# Patient Record
Sex: MALE | Race: WHITE | Employment: STUDENT | URBAN - METROPOLITAN AREA
[De-identification: names, ages, dates, MRNs, and addresses within clinical notes are randomized per-mention and may not be internally consistent; named-entity substitution may affect disease eponyms.]

---
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
## 2020-01-01 ENCOUNTER — HOSPITAL ENCOUNTER (OUTPATIENT)
Age: 20
Discharge: HOME | End: 2020-01-01
Attending: FAMILY MEDICINE
Payer: COMMERCIAL

## 2020-01-01 VITALS
TEMPERATURE: 100 F | SYSTOLIC BLOOD PRESSURE: 157 MMHG | DIASTOLIC BLOOD PRESSURE: 95 MMHG | RESPIRATION RATE: 20 BRPM | HEART RATE: 102 BPM | OXYGEN SATURATION: 96 %

## 2020-01-01 DIAGNOSIS — J11.1 FLU SYNDROME: Primary | ICD-10-CM

## 2020-01-01 DIAGNOSIS — J02.9 ACUTE PHARYNGITIS, UNSPECIFIED ETIOLOGY: ICD-10-CM

## 2020-01-01 DIAGNOSIS — B35.6 JOCK ITCH: ICD-10-CM

## 2020-01-01 LAB
POCT INFLUENZA A: NEGATIVE
POCT INFLUENZA B: NEGATIVE
POCT RAPID STREP: NEGATIVE

## 2020-01-01 PROCEDURE — 99204 OFFICE O/P NEW MOD 45 MIN: CPT

## 2020-01-01 PROCEDURE — 87502 INFLUENZA DNA AMP PROBE: CPT | Performed by: FAMILY MEDICINE

## 2020-01-01 PROCEDURE — 87081 CULTURE SCREEN ONLY: CPT | Performed by: FAMILY MEDICINE

## 2020-01-01 PROCEDURE — 87430 STREP A AG IA: CPT | Performed by: FAMILY MEDICINE

## 2020-01-01 RX ORDER — OSELTAMIVIR PHOSPHATE 75 MG/1
75 CAPSULE ORAL 2 TIMES DAILY
Qty: 10 CAPSULE | Refills: 0 | Status: SHIPPED | OUTPATIENT
Start: 2020-01-01 | End: 2020-01-06

## 2020-01-01 RX ORDER — ONDANSETRON 8 MG/1
8 TABLET, ORALLY DISINTEGRATING ORAL EVERY 12 HOURS PRN
Qty: 10 TABLET | Refills: 0 | Status: SHIPPED | OUTPATIENT
Start: 2020-01-01 | End: 2020-01-09 | Stop reason: ALTCHOICE

## 2020-01-01 RX ORDER — IBUPROFEN 600 MG/1
600 TABLET ORAL ONCE
Status: COMPLETED | OUTPATIENT
Start: 2020-01-01 | End: 2020-01-01

## 2020-01-01 RX ORDER — CLOTRIMAZOLE AND BETAMETHASONE DIPROPIONATE 10; .64 MG/G; MG/G
CREAM TOPICAL
Qty: 45 G | Refills: 0 | Status: SHIPPED | OUTPATIENT
Start: 2020-01-01 | End: 2021-01-15

## 2020-01-01 RX ORDER — BENZONATATE 200 MG/1
200 CAPSULE ORAL 3 TIMES DAILY PRN
Qty: 15 CAPSULE | Refills: 0 | Status: SHIPPED | OUTPATIENT
Start: 2020-01-01 | End: 2020-01-09 | Stop reason: ALTCHOICE

## 2020-01-01 NOTE — ED PROVIDER NOTES
Patient Seen in: [facility]      History   Patient presents with:  Cough/URI    Stated Complaint: cough, mucus, no energy, chills/fever     HPI    26-year-old male  presents to the immediate care today with chief complaints of f discharge, mild congestion, no sinus tenderness. No nasal flaring  Throat: abnormal findings: mild oropharyngeal erythema. Buccal mucosa moist.   Neck: no adenopathy  Lungs: clear to auscultation bilaterally. No chest wall retractions.  No respiratory distr for cough. Qty: 15 capsule Refills: 0    clotrimazole-betamethasone 1-0.05 % External Cream  Apply to affected area twice a day until resolution of the rash.   Avoid contact with normal skin  Qty: 45 g Refills: 0

## 2020-01-06 ENCOUNTER — HOSPITAL ENCOUNTER (OUTPATIENT)
Age: 20
Discharge: ANOTHER HOSPITAL | End: 2020-01-06
Attending: FAMILY MEDICINE
Payer: COMMERCIAL

## 2020-01-06 ENCOUNTER — HOSPITAL ENCOUNTER (EMERGENCY)
Facility: HOSPITAL | Age: 20
Discharge: HOME | End: 2020-01-06
Attending: EMERGENCY MEDICINE
Payer: COMMERCIAL

## 2020-01-06 ENCOUNTER — APPOINTMENT (OUTPATIENT)
Dept: GENERAL RADIOLOGY | Age: 20
End: 2020-01-06
Attending: PHYSICIAN ASSISTANT
Payer: COMMERCIAL

## 2020-01-06 VITALS
DIASTOLIC BLOOD PRESSURE: 83 MMHG | BODY MASS INDEX: 28 KG/M2 | RESPIRATION RATE: 18 BRPM | OXYGEN SATURATION: 100 % | TEMPERATURE: 98 F | WEIGHT: 231.25 LBS | HEART RATE: 88 BPM | SYSTOLIC BLOOD PRESSURE: 150 MMHG

## 2020-01-06 VITALS
OXYGEN SATURATION: 91 % | TEMPERATURE: 99 F | HEART RATE: 92 BPM | SYSTOLIC BLOOD PRESSURE: 161 MMHG | RESPIRATION RATE: 20 BRPM | DIASTOLIC BLOOD PRESSURE: 82 MMHG

## 2020-01-06 DIAGNOSIS — J18.9 COMMUNITY ACQUIRED PNEUMONIA, UNSPECIFIED LATERALITY: Primary | ICD-10-CM

## 2020-01-06 DIAGNOSIS — J18.9 MULTIFOCAL PNEUMONIA: Primary | ICD-10-CM

## 2020-01-06 DIAGNOSIS — R11.2 NON-INTRACTABLE VOMITING WITH NAUSEA, UNSPECIFIED VOMITING TYPE: ICD-10-CM

## 2020-01-06 DIAGNOSIS — R09.02 HYPOXIA: ICD-10-CM

## 2020-01-06 LAB
#MXD IC: 0.9 X10ˆ3/UL (ref 0.1–1)
ALBUMIN SERPL-MCNC: 3.2 G/DL (ref 3.4–5)
ALBUMIN/GLOB SERPL: 0.8 {RATIO} (ref 1–2)
ALP LIVER SERPL-CCNC: 67 U/L (ref 45–117)
ALT SERPL-CCNC: 22 U/L (ref 16–61)
ANION GAP SERPL CALC-SCNC: 9 MMOL/L (ref 0–18)
AST SERPL-CCNC: 14 U/L (ref 15–37)
BILIRUB SERPL-MCNC: 0.7 MG/DL (ref 0.1–2)
BUN BLD-MCNC: 13 MG/DL (ref 7–18)
BUN/CREAT SERPL: 16.7 (ref 10–20)
CALCIUM BLD-MCNC: 8.6 MG/DL (ref 8.5–10.1)
CHLORIDE SERPL-SCNC: 106 MMOL/L (ref 98–112)
CO2 SERPL-SCNC: 24 MMOL/L (ref 21–32)
CREAT BLD-MCNC: 0.7 MG/DL (ref 0.7–1.3)
CREAT BLD-MCNC: 0.78 MG/DL (ref 0.7–1.3)
GLOBULIN PLAS-MCNC: 4 G/DL (ref 2.8–4.4)
GLUCOSE BLD-MCNC: 104 MG/DL (ref 70–99)
GLUCOSE BLD-MCNC: 104 MG/DL (ref 70–99)
HCT VFR BLD AUTO: 42.2 % (ref 39–53)
HGB BLD-MCNC: 14.7 G/DL (ref 13–17.5)
ISTAT BUN: 12 MG/DL (ref 8–20)
ISTAT CHLORIDE: 101 MMOL/L (ref 101–111)
ISTAT HEMATOCRIT: 44 % (ref 37–53)
ISTAT IONIZED CALCIUM FOR CHEM 8: 1.13 MMOL/L (ref 1.12–1.32)
ISTAT POTASSIUM: 3.5 MMOL/L (ref 3.6–5.1)
ISTAT SODIUM: 138 MMOL/L (ref 136–145)
LIPASE SERPL-CCNC: 40 U/L (ref 73–393)
LYMPHOCYTES # BLD AUTO: 1.2 X10ˆ3/UL (ref 1–4)
LYMPHOCYTES NFR BLD AUTO: 10.4 %
M PROTEIN MFR SERPL ELPH: 7.2 G/DL (ref 6.4–8.2)
MCH RBC QN AUTO: 29.4 PG (ref 26–34)
MCHC RBC AUTO-ENTMCNC: 34.8 G/DL (ref 31–37)
MCV RBC AUTO: 84.4 FL (ref 80–100)
MIXED CELL %: 8.1 %
NEUTROPHILS # BLD AUTO: 9 X10ˆ3/UL (ref 1.5–7.7)
NEUTROPHILS NFR BLD AUTO: 81.5 %
OSMOLALITY SERPL CALC.SUM OF ELEC: 288 MOSM/KG (ref 275–295)
PATIENT FASTING Y/N/NP: NO
PLATELET # BLD AUTO: 235 X10ˆ3/UL (ref 150–450)
POTASSIUM SERPL-SCNC: 3.5 MMOL/L (ref 3.5–5.1)
RBC # BLD AUTO: 5 X10ˆ6/UL (ref 4.3–5.7)
SODIUM SERPL-SCNC: 139 MMOL/L (ref 136–145)
WBC # BLD AUTO: 11.1 X10ˆ3/UL (ref 4–11)

## 2020-01-06 PROCEDURE — 96361 HYDRATE IV INFUSION ADD-ON: CPT

## 2020-01-06 PROCEDURE — 96375 TX/PRO/DX INJ NEW DRUG ADDON: CPT

## 2020-01-06 PROCEDURE — 99284 EMERGENCY DEPT VISIT MOD MDM: CPT

## 2020-01-06 PROCEDURE — 99214 OFFICE O/P EST MOD 30 MIN: CPT

## 2020-01-06 PROCEDURE — 94640 AIRWAY INHALATION TREATMENT: CPT

## 2020-01-06 PROCEDURE — 96367 TX/PROPH/DG ADDL SEQ IV INF: CPT

## 2020-01-06 PROCEDURE — 80047 BASIC METABLC PNL IONIZED CA: CPT

## 2020-01-06 PROCEDURE — 83690 ASSAY OF LIPASE: CPT | Performed by: EMERGENCY MEDICINE

## 2020-01-06 PROCEDURE — 80053 COMPREHEN METABOLIC PANEL: CPT | Performed by: EMERGENCY MEDICINE

## 2020-01-06 PROCEDURE — 96374 THER/PROPH/DIAG INJ IV PUSH: CPT

## 2020-01-06 PROCEDURE — 71046 X-RAY EXAM CHEST 2 VIEWS: CPT | Performed by: PHYSICIAN ASSISTANT

## 2020-01-06 PROCEDURE — 96365 THER/PROPH/DIAG IV INF INIT: CPT

## 2020-01-06 PROCEDURE — 85025 COMPLETE CBC W/AUTO DIFF WBC: CPT | Performed by: PHYSICIAN ASSISTANT

## 2020-01-06 RX ORDER — CEFDINIR 300 MG/1
300 CAPSULE ORAL 2 TIMES DAILY
Qty: 20 CAPSULE | Refills: 0 | Status: SHIPPED | OUTPATIENT
Start: 2020-01-06 | End: 2020-01-16

## 2020-01-06 RX ORDER — ONDANSETRON 2 MG/ML
4 INJECTION INTRAMUSCULAR; INTRAVENOUS ONCE
Status: COMPLETED | OUTPATIENT
Start: 2020-01-06 | End: 2020-01-06

## 2020-01-06 RX ORDER — IPRATROPIUM BROMIDE AND ALBUTEROL SULFATE 2.5; .5 MG/3ML; MG/3ML
3 SOLUTION RESPIRATORY (INHALATION)
Status: DISCONTINUED | OUTPATIENT
Start: 2020-01-06 | End: 2020-01-06

## 2020-01-06 RX ORDER — AZITHROMYCIN 250 MG/1
500 TABLET, FILM COATED ORAL ONCE
Status: DISCONTINUED | OUTPATIENT
Start: 2020-01-06 | End: 2020-01-06

## 2020-01-06 RX ORDER — DEXAMETHASONE SODIUM PHOSPHATE 4 MG/ML
20 VIAL (ML) INJECTION ONCE
Status: COMPLETED | OUTPATIENT
Start: 2020-01-06 | End: 2020-01-06

## 2020-01-06 RX ORDER — ALBUTEROL SULFATE 2.5 MG/3ML
2.5 SOLUTION RESPIRATORY (INHALATION) EVERY 4 HOURS PRN
Qty: 30 AMPULE | Refills: 0 | Status: SHIPPED | OUTPATIENT
Start: 2020-01-06 | End: 2020-02-05

## 2020-01-06 RX ORDER — IPRATROPIUM BROMIDE AND ALBUTEROL SULFATE 2.5; .5 MG/3ML; MG/3ML
3 SOLUTION RESPIRATORY (INHALATION)
Status: COMPLETED | OUTPATIENT
Start: 2020-01-06 | End: 2020-01-06

## 2020-01-06 RX ORDER — ONDANSETRON 4 MG/1
4 TABLET, ORALLY DISINTEGRATING ORAL EVERY 8 HOURS PRN
Qty: 10 TABLET | Refills: 0 | Status: SHIPPED | OUTPATIENT
Start: 2020-01-06 | End: 2020-01-13

## 2020-01-06 RX ORDER — SODIUM CHLORIDE 9 MG/ML
INJECTION, SOLUTION INTRAVENOUS ONCE
Status: COMPLETED | OUTPATIENT
Start: 2020-01-06 | End: 2020-01-06

## 2020-01-06 RX ORDER — ALBUTEROL SULFATE 2.5 MG/3ML
2.5 SOLUTION RESPIRATORY (INHALATION) ONCE
Status: COMPLETED | OUTPATIENT
Start: 2020-01-06 | End: 2020-01-06

## 2020-01-06 RX ORDER — DEXAMETHASONE SODIUM PHOSPHATE 4 MG/ML
20 VIAL (ML) INJECTION ONCE
Status: DISCONTINUED | OUTPATIENT
Start: 2020-01-06 | End: 2020-01-06

## 2020-01-06 RX ORDER — IPRATROPIUM BROMIDE AND ALBUTEROL SULFATE 2.5; .5 MG/3ML; MG/3ML
3 SOLUTION RESPIRATORY (INHALATION) ONCE
Status: COMPLETED | OUTPATIENT
Start: 2020-01-06 | End: 2020-01-06

## 2020-01-06 RX ORDER — AZITHROMYCIN 250 MG/1
250 TABLET, FILM COATED ORAL DAILY
Qty: 4 TABLET | Refills: 0 | Status: SHIPPED | OUTPATIENT
Start: 2020-01-06 | End: 2020-01-10

## 2020-01-06 NOTE — ED PROVIDER NOTES
Patient Seen in: [facility] Emergency Department      History   Patient presents with:  Dyspnea YAYO SOB    Stated Complaint: + pneumonia from [facility]    HPI    Patient is a 20year-old's been sick for more than a week with nasal congestion, cough, fati membranes are pearly white bilaterally, with normal light reflex and normal landmarks. Oropharynx shows moist mucous membranes with no erythema or exudate. Uvula midline, no drooling, no stridor.     Neck is supple with no lymphadenopathy or meningismus 15:39                [facility]     Patient was given IV normal saline fluid bolus as well as IV ceftriaxone and Zithromax. I will check a comprehensive metabolic profile lipase to be sure they are normal.  Patient is had no further vomiting here.   Patient care 1 capsule (300 mg total) by mouth 2 (two) times daily for 10 days. , Print, Disp-20 capsule, R-0    albuterol sulfate (2.5 MG/3ML) 0.083% Inhalation Nebu Soln  Take 3 mL (2.5 mg total) by nebulization every 4 (four) hours as needed for Wheezing or Shortness

## 2020-01-06 NOTE — ED INITIAL ASSESSMENT (HPI)
19 y/o male to ED with c/o of hypoxia. Patient was dx with pneumonia today at [address] [place] [address]. Patient reports he has been sick a few days after eduardo, patient was seen at urgent care on the 1st and was dx with possible flu. Patient was treated with tamiflu.  Ginger

## 2020-01-06 NOTE — ED INITIAL ASSESSMENT (HPI)
Here due to cough and feeling worse, vomiting and diarrhea. Left ear pressure. Feeling short of breath with coughing episodes.

## 2020-01-06 NOTE — ED NOTES
Pt returned from xray was placed back on continuous monitoring for pulse ox and as treatment with o2 ending 02 sat 90-91, although pt states he is still feeling poorly, but no longer nauseated, lungs still with rhonchi, crackles and wheezing.   JOHNATHAN rodriguez

## 2020-01-07 NOTE — CM/SW NOTE
Unable to provide patient with nebulizer machine through our provider due to his insurance. Patient given list of pharmacies that carry nebulizer machines, tubing and masks.  Called [facility] who is the closest and cheapest to find out if they have any stock

## 2020-01-09 ENCOUNTER — OFFICE VISIT (OUTPATIENT)
Dept: INTERNAL MEDICINE CLINIC | Facility: CLINIC | Age: 20
End: 2020-01-09

## 2020-01-09 VITALS
HEIGHT: 75.59 IN | TEMPERATURE: 98 F | BODY MASS INDEX: 27.93 KG/M2 | DIASTOLIC BLOOD PRESSURE: 66 MMHG | RESPIRATION RATE: 18 BRPM | WEIGHT: 227 LBS | HEART RATE: 92 BPM | SYSTOLIC BLOOD PRESSURE: 86 MMHG

## 2020-01-09 DIAGNOSIS — J18.9 MULTIFOCAL PNEUMONIA: Primary | ICD-10-CM

## 2020-01-09 PROCEDURE — 99203 OFFICE O/P NEW LOW 30 MIN: CPT | Performed by: INTERNAL MEDICINE

## 2020-01-09 RX ORDER — ONDANSETRON 4 MG/1
4 TABLET, FILM COATED ORAL EVERY 8 HOURS PRN
Qty: 20 TABLET | Refills: 0 | Status: SHIPPED | OUTPATIENT
Start: 2020-01-09 | End: 2020-01-13

## 2020-01-09 RX ORDER — PREDNISONE 20 MG/1
20 TABLET ORAL DAILY
Qty: 5 TABLET | Refills: 0 | Status: SHIPPED | OUTPATIENT
Start: 2020-01-09 | End: 2020-01-14

## 2020-01-09 RX ORDER — CODEINE PHOSPHATE AND GUAIFENESIN 10; 100 MG/5ML; MG/5ML
5 SOLUTION ORAL EVERY EVENING
Qty: 35 ML | Refills: 0 | Status: SHIPPED | OUTPATIENT
Start: 2020-01-09 | End: 2020-01-16

## 2020-01-09 NOTE — PROGRESS NOTES
[facility]  9/20/2000    Patient presents with:  Establish Care: ER 1/6, Dx'd pneumonia, low energy, SOB, nausea, chills/sweats  Hearing Problem: L ear, \"hearing almost gone\"      Eliz Hammond is a 20year old male who pres Dispersible Take 1 tablet (4 mg total) by mouth every 8 (eight) hours as needed. 10 tablet 0   • clotrimazole-betamethasone 1-0.05 % External Cream Apply to affected area twice a day until resolution of the rash.   Avoid contact with normal skin 45 g 0   • No rash. Psychiatric: Normal mood and affect. ASSESSMENT AND PLAN:   Lis Diaz is a 20year old male who presents as an ED follow-up.     Multifocal community-acquired pneumonia:  Continue current antibiotic therapy to completion  Given the

## 2020-01-13 ENCOUNTER — TELEPHONE (OUTPATIENT)
Dept: INTERNAL MEDICINE CLINIC | Facility: CLINIC | Age: 20
End: 2020-01-13

## 2020-01-13 RX ORDER — ONDANSETRON 4 MG/1
4 TABLET, FILM COATED ORAL EVERY 8 HOURS PRN
Qty: 20 TABLET | Refills: 0 | Status: SHIPPED | OUTPATIENT
Start: 2020-01-13 | End: 2020-01-18

## 2020-01-13 NOTE — TELEPHONE ENCOUNTER
Called and spoke with pt's mother, Gissel Grace ([id number] [address] per HIPAA), stating pt is overall doing better. Still lingering cough, fatigued, but improved.  Spoke with pharmacist over weekend recommended OTC Prilosec for acid reflux as was unable to keep food down x2 days

## 2020-01-13 NOTE — TELEPHONE ENCOUNTER
Pt's mom, Brain Madden (on hipaa) stated her son needs a new note to ok to return to school on Wednesday, as pt is still sick (ADoffered to write if needed). Mom stated he is getting better. Will need note today as son is returning to school tomorrow.   Please a

## 2020-01-13 NOTE — TELEPHONE ENCOUNTER
Per PSR, indicated per pt's mother will  letter tonight. Printed letter at  as requested. AD- please address refill.

## 2020-06-15 ENCOUNTER — TELEPHONE (OUTPATIENT)
Dept: INTERNAL MEDICINE CLINIC | Facility: CLINIC | Age: 20
End: 2020-06-15

## 2020-06-15 DIAGNOSIS — H69.80 DYSFUNCTION OF EUSTACHIAN TUBE, UNSPECIFIED LATERALITY: Primary | ICD-10-CM

## 2020-06-15 NOTE — TELEPHONE ENCOUNTER
Pt mom Timothyjaz Pearsonmiguel on HIPPA called stating pt had tubes put in his ears 3/19 in [place] while at school and they have not fallen out yet-was told by AD in January that if they do not fall out by now he would need to see ENT to have them removed-pt is home now and no

## 2020-10-20 ENCOUNTER — TELEPHONE (OUTPATIENT)
Dept: INTERNAL MEDICINE CLINIC | Facility: CLINIC | Age: 20
End: 2020-10-20

## 2020-10-20 NOTE — TELEPHONE ENCOUNTER
Father on hipaa is calling to request a back dated referral for DOS 6/22/2020 and 7/10/2020. [facility] which he stated our providers informed him to go there for rapid results for covid.  He contacted the insurance company and was told it

## 2020-10-21 NOTE — TELEPHONE ENCOUNTER
Father is calling to check on the status. 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 He was never told he needed a referral from his PCP.  Later pt's sibling was exposed an

## 2020-10-29 NOTE — TELEPHONE ENCOUNTER
Spoke to pt's dad, Margy Santos (hipaa approved) . I explained to him there is no documentation regarding this communication. He would have to appeal this with his insurance company. He understood and will check into the appeals process.

## 2020-12-22 ENCOUNTER — IMMUNIZATION (OUTPATIENT)
Dept: INTERNAL MEDICINE CLINIC | Facility: CLINIC | Age: 20
End: 2020-12-22

## 2020-12-22 DIAGNOSIS — Z23 NEED FOR VACCINATION: ICD-10-CM

## 2020-12-22 PROCEDURE — 90471 IMMUNIZATION ADMIN: CPT | Performed by: INTERNAL MEDICINE

## 2020-12-22 PROCEDURE — 90686 IIV4 VACC NO PRSV 0.5 ML IM: CPT | Performed by: INTERNAL MEDICINE

## 2021-01-04 ENCOUNTER — TELEPHONE (OUTPATIENT)
Dept: INTERNAL MEDICINE CLINIC | Facility: CLINIC | Age: 21
End: 2021-01-04

## 2021-01-04 NOTE — TELEPHONE ENCOUNTER
Pt mom on hippa called asking if pt can get covid test here prior to going back to school?  Please advise if needs appt

## 2021-01-04 NOTE — TELEPHONE ENCOUNTER
Please refer the patient to a local testing facility. At this time we are not able to order  for return to school screening.

## 2021-01-04 NOTE — TELEPHONE ENCOUNTER
Pt has an HMO insurance, will need a referral or COVID testing needs to be done at [facility] or the test is not paid for. Mother indicates pt was tested twice this summer at other facilities at $273 a piece that are not covered by pt's HMO.   Please

## 2021-01-05 NOTE — TELEPHONE ENCOUNTER
Future Appointments   Date Time Provider Dinesh Kelsi   1/6/2021 10:00 AM Isidoro Mario MD [facility]   1/15/2021 12:00 PM Bear David [facility] [address] [facility]

## 2021-01-06 ENCOUNTER — TELEMEDICINE (OUTPATIENT)
Dept: INTERNAL MEDICINE CLINIC | Facility: CLINIC | Age: 21
End: 2021-01-06

## 2021-01-06 DIAGNOSIS — Z71.89 COUNSELED ABOUT COVID-19 VIRUS INFECTION: Primary | ICD-10-CM

## 2021-01-06 PROCEDURE — 99213 OFFICE O/P EST LOW 20 MIN: CPT | Performed by: INTERNAL MEDICINE

## 2021-01-06 NOTE — PROGRESS NOTES
Chiki Daily  9/20/2000    No chief complaint on file. Video encounter     SUBJECTIVE   Chiki Lopez is a 23year old male who presents for covid19 testing. The patient has been in his usual state of health.  He has maintained precaution Refills:  Requested Prescriptions      No prescriptions requested or ordered in this encounter       Imaging & Consults:  None    No follow-ups on file. There are no Patient Instructions on file for this visit.     All questions were answered and the [facility]

## 2021-01-13 ENCOUNTER — LAB ENCOUNTER (OUTPATIENT)
Dept: LAB | Facility: HOSPITAL | Age: 21
End: 2021-01-13
Attending: INTERNAL MEDICINE
Payer: COMMERCIAL

## 2021-01-13 DIAGNOSIS — Z01.812 PRE-PROCEDURE LAB EXAM: Primary | ICD-10-CM

## 2021-01-14 LAB — SARS-COV-2 RNA RESP QL NAA+PROBE: NOT DETECTED

## 2021-08-17 ENCOUNTER — OFFICE VISIT (OUTPATIENT)
Dept: INTERNAL MEDICINE CLINIC | Facility: CLINIC | Age: 21
End: 2021-08-17

## 2021-08-17 VITALS
TEMPERATURE: 99 F | OXYGEN SATURATION: 98 % | HEART RATE: 67 BPM | SYSTOLIC BLOOD PRESSURE: 118 MMHG | DIASTOLIC BLOOD PRESSURE: 60 MMHG | WEIGHT: 245.38 LBS | BODY MASS INDEX: 29.88 KG/M2 | HEIGHT: 76 IN

## 2021-08-17 DIAGNOSIS — Z13.0 SCREENING FOR BLOOD DISEASE: ICD-10-CM

## 2021-08-17 DIAGNOSIS — F41.0 PANIC DISORDER WITHOUT AGORAPHOBIA: ICD-10-CM

## 2021-08-17 DIAGNOSIS — R06.2 WHEEZING: ICD-10-CM

## 2021-08-17 DIAGNOSIS — Z13.29 SCREENING FOR THYROID DISORDER: ICD-10-CM

## 2021-08-17 DIAGNOSIS — Z13.220 SCREENING FOR LIPID DISORDERS: ICD-10-CM

## 2021-08-17 DIAGNOSIS — F41.1 GENERALIZED ANXIETY DISORDER: ICD-10-CM

## 2021-08-17 DIAGNOSIS — F90.2 ATTENTION DEFICIT HYPERACTIVITY DISORDER (ADHD), COMBINED TYPE: ICD-10-CM

## 2021-08-17 DIAGNOSIS — Z87.01 HISTORY OF PNEUMONIA: ICD-10-CM

## 2021-08-17 DIAGNOSIS — T78.40XA ALLERGY, INITIAL ENCOUNTER: ICD-10-CM

## 2021-08-17 DIAGNOSIS — F32.A DEPRESSION, UNSPECIFIED DEPRESSION TYPE: ICD-10-CM

## 2021-08-17 DIAGNOSIS — Z13.228 SCREENING FOR METABOLIC DISORDER: ICD-10-CM

## 2021-08-17 DIAGNOSIS — Z00.00 ROUTINE GENERAL MEDICAL EXAMINATION AT A HEALTH CARE FACILITY: Primary | ICD-10-CM

## 2021-08-17 PROCEDURE — 3008F BODY MASS INDEX DOCD: CPT | Performed by: INTERNAL MEDICINE

## 2021-08-17 PROCEDURE — 3074F SYST BP LT 130 MM HG: CPT | Performed by: INTERNAL MEDICINE

## 2021-08-17 PROCEDURE — 99395 PREV VISIT EST AGE 18-39: CPT | Performed by: INTERNAL MEDICINE

## 2021-08-17 PROCEDURE — 3078F DIAST BP <80 MM HG: CPT | Performed by: INTERNAL MEDICINE

## 2021-08-17 NOTE — PROGRESS NOTES
Charlie Ely  9/20/2000    Patient presents with:  Physical: mn room 6 pt here for annual physical exam       HPI:   Charlie Ely is a 23year old male who presents for an annual physical examination.     The patient describes a recent histo Amphetamine-Dextroamphet ER (ADDERALL XR) 25 MG Oral Capsule SR 24 Hr Take 1 capsule (25 mg total) by mouth daily. 30 capsule 0   • amphetamine-dextroamphetamine 5 MG Oral Tab Take 1 tablet (5 mg total) by mouth daily.  30 tablet 0   • Amphetamine-Dextroamp good BS's,no masses, HSM or tenderness  : Normal examination of male genitalia    ASSESSMENT AND PLAN:   Lucie Bernal is a 23year old male who presents for an annual physical examination.     Outstanding screening and preventive measures:  Melody

## 2021-08-18 ENCOUNTER — HOSPITAL ENCOUNTER (OUTPATIENT)
Dept: GENERAL RADIOLOGY | Age: 21
Discharge: HOME | End: 2021-08-18
Attending: INTERNAL MEDICINE
Payer: COMMERCIAL

## 2021-08-18 ENCOUNTER — LABORATORY ENCOUNTER (OUTPATIENT)
Dept: LAB | Age: 21
End: 2021-08-18
Attending: INTERNAL MEDICINE
Payer: COMMERCIAL

## 2021-08-18 DIAGNOSIS — Z87.01 HISTORY OF PNEUMONIA: ICD-10-CM

## 2021-08-18 DIAGNOSIS — Z13.29 SCREENING FOR THYROID DISORDER: ICD-10-CM

## 2021-08-18 DIAGNOSIS — Z00.00 ROUTINE GENERAL MEDICAL EXAMINATION AT A HEALTH CARE FACILITY: ICD-10-CM

## 2021-08-18 DIAGNOSIS — R06.2 WHEEZING: ICD-10-CM

## 2021-08-18 LAB
ALBUMIN SERPL-MCNC: 4.4 G/DL (ref 3.4–5)
ALBUMIN/GLOB SERPL: 1.3 {RATIO} (ref 1–2)
ALP LIVER SERPL-CCNC: 81 U/L
ALT SERPL-CCNC: 32 U/L
ANION GAP SERPL CALC-SCNC: 6 MMOL/L (ref 0–18)
AST SERPL-CCNC: 14 U/L (ref 15–37)
BASOPHILS # BLD AUTO: 0.07 X10(3) UL (ref 0–0.2)
BASOPHILS NFR BLD AUTO: 0.8 %
BILIRUB SERPL-MCNC: 0.8 MG/DL (ref 0.1–2)
BUN BLD-MCNC: 12 MG/DL (ref 7–18)
CALCIUM BLD-MCNC: 9.3 MG/DL (ref 8.5–10.1)
CHLORIDE SERPL-SCNC: 106 MMOL/L (ref 98–112)
CHOLEST SMN-MCNC: 208 MG/DL (ref ?–200)
CO2 SERPL-SCNC: 26 MMOL/L (ref 21–32)
CREAT BLD-MCNC: 0.97 MG/DL
EOSINOPHIL # BLD AUTO: 0.47 X10(3) UL (ref 0–0.7)
EOSINOPHIL NFR BLD AUTO: 5.4 %
ERYTHROCYTE [DISTWIDTH] IN BLOOD BY AUTOMATED COUNT: 12.3 %
GLOBULIN PLAS-MCNC: 3.3 G/DL (ref 2.8–4.4)
GLUCOSE BLD-MCNC: 109 MG/DL (ref 70–99)
HCT VFR BLD AUTO: 49.1 %
HDLC SERPL-MCNC: 32 MG/DL (ref 40–59)
HGB BLD-MCNC: 16.2 G/DL
IMM GRANULOCYTES # BLD AUTO: 0.02 X10(3) UL (ref 0–1)
IMM GRANULOCYTES NFR BLD: 0.2 %
LDLC SERPL CALC-MCNC: 147 MG/DL (ref ?–100)
LYMPHOCYTES # BLD AUTO: 2.7 X10(3) UL (ref 1–4)
LYMPHOCYTES NFR BLD AUTO: 30.9 %
M PROTEIN MFR SERPL ELPH: 7.7 G/DL (ref 6.4–8.2)
MCH RBC QN AUTO: 30.1 PG (ref 26–34)
MCHC RBC AUTO-ENTMCNC: 33 G/DL (ref 31–37)
MCV RBC AUTO: 91.3 FL
MONOCYTES # BLD AUTO: 0.59 X10(3) UL (ref 0.1–1)
MONOCYTES NFR BLD AUTO: 6.8 %
NEUTROPHILS # BLD AUTO: 4.88 X10 (3) UL (ref 1.5–7.7)
NEUTROPHILS # BLD AUTO: 4.88 X10(3) UL (ref 1.5–7.7)
NEUTROPHILS NFR BLD AUTO: 55.9 %
NONHDLC SERPL-MCNC: 176 MG/DL (ref ?–130)
OSMOLALITY SERPL CALC.SUM OF ELEC: 286 MOSM/KG (ref 275–295)
PATIENT FASTING Y/N/NP: YES
PATIENT FASTING Y/N/NP: YES
PLATELET # BLD AUTO: 220 10(3)UL (ref 150–450)
POTASSIUM SERPL-SCNC: 3.7 MMOL/L (ref 3.5–5.1)
RBC # BLD AUTO: 5.38 X10(6)UL
SODIUM SERPL-SCNC: 138 MMOL/L (ref 136–145)
TRIGL SERPL-MCNC: 160 MG/DL (ref 30–149)
TSI SER-ACNC: 2.52 MIU/ML (ref 0.36–3.74)
VLDLC SERPL CALC-MCNC: 30 MG/DL (ref 0–30)
WBC # BLD AUTO: 8.7 X10(3) UL (ref 4–11)

## 2021-08-18 PROCEDURE — 80053 COMPREHEN METABOLIC PANEL: CPT | Performed by: INTERNAL MEDICINE

## 2021-08-18 PROCEDURE — 71046 X-RAY EXAM CHEST 2 VIEWS: CPT | Performed by: INTERNAL MEDICINE

## 2021-08-18 PROCEDURE — 80061 LIPID PANEL: CPT | Performed by: INTERNAL MEDICINE

## 2021-08-18 PROCEDURE — 85025 COMPLETE CBC W/AUTO DIFF WBC: CPT | Performed by: INTERNAL MEDICINE

## 2021-08-18 PROCEDURE — 36415 COLL VENOUS BLD VENIPUNCTURE: CPT | Performed by: INTERNAL MEDICINE

## 2021-08-18 PROCEDURE — 84443 ASSAY THYROID STIM HORMONE: CPT

## 2021-12-28 ENCOUNTER — OFFICE VISIT (OUTPATIENT)
Dept: FAMILY MEDICINE CLINIC | Facility: CLINIC | Age: 21
End: 2021-12-28

## 2021-12-28 VITALS
TEMPERATURE: 100 F | SYSTOLIC BLOOD PRESSURE: 149 MMHG | DIASTOLIC BLOOD PRESSURE: 85 MMHG | BODY MASS INDEX: 30.44 KG/M2 | HEART RATE: 118 BPM | RESPIRATION RATE: 18 BRPM | HEIGHT: 76 IN | OXYGEN SATURATION: 98 % | WEIGHT: 250 LBS

## 2021-12-28 DIAGNOSIS — J02.9 SORE THROAT: ICD-10-CM

## 2021-12-28 DIAGNOSIS — J06.9 UPPER RESPIRATORY TRACT INFECTION, UNSPECIFIED TYPE: Primary | ICD-10-CM

## 2021-12-28 PROCEDURE — 87880 STREP A ASSAY W/OPTIC: CPT | Performed by: PHYSICIAN ASSISTANT

## 2021-12-28 PROCEDURE — 87081 CULTURE SCREEN ONLY: CPT | Performed by: PHYSICIAN ASSISTANT

## 2021-12-28 PROCEDURE — 99213 OFFICE O/P EST LOW 20 MIN: CPT | Performed by: PHYSICIAN ASSISTANT

## 2021-12-28 PROCEDURE — 3079F DIAST BP 80-89 MM HG: CPT | Performed by: PHYSICIAN ASSISTANT

## 2021-12-28 PROCEDURE — 3008F BODY MASS INDEX DOCD: CPT | Performed by: PHYSICIAN ASSISTANT

## 2021-12-28 PROCEDURE — 3077F SYST BP >= 140 MM HG: CPT | Performed by: PHYSICIAN ASSISTANT

## 2021-12-28 NOTE — PROGRESS NOTES
CHIEF COMPLAINT:     Patient presents with:  Sore Throat      HPI:   Chase Gongora is a 24year old male who presents with complaints of feeling sick for 2 days. Symptoms include nasal congestion, cough, sore throat, and fatigue.   The patient denie (37.5 °C) (Oral)   Resp 18   Ht 6' 4\" (1.93 m)   Wt 250 lb (113.4 kg)   SpO2 98%   BMI 30.43 kg/m²   GENERAL: well developed, well nourished,in no apparent distress, cooperative, clammy  SKIN: no rashes, nosuspicious lesions, no abnormal pigmentation  HEA reliable answers to any questions they may have. Please review the entirety of this informational document. It includes information related to exposure, pending tests, positive results, aftercare, and plasma donation.       Quarantine (for anyone in [place] authority or healthcare provider. • Wear a mask, stay at least 6 feet from others, wash your hands, avoid crowds, and take other steps to prevent the spread of COVID-19.   CDC continues to endorse quarantine for 14 days and recognizes that any quarantine s seeking further care. Process measures to keep everyone safe in this difficult time are changing frequently. Your healthcare provider can help direct you on next steps.     If you have not been exposed or are not aware of an exposure to COVID-19 and are co Plasma Donation Program  [facility], in conjunction with [facility] is looking for patients who have recovered from COVID-19 and would be interested in donating plasma.     Convalescent plasma is a component of blood that, in people who have TeodorokeExchange.nl. pdf  SoundCloud.Signature.cy  http://www.[facility].[place].gov/topics-services/diseases-and-conditions/dise https://health.[facility] [place]/coronavirus/covid-19-information/covid-19-long-haulers. html  Long-term effects of covid-19. (n.d.).  Retrieved May 11, 2021, from [employer].  What it means to be A Coronavirus or soup). Extra fluids will help loosen secretions in the nose and lungs.   · Over-the-counter cold medicines will not shorten the length of time you’re sick, but they may be helpful for the following symptoms: cough, sore throat, and nasal and sinus conges

## 2021-12-29 NOTE — PATIENT INSTRUCTIONS
1. OTC symptom support  2. COVID pending  3. Throat culture pending  4. Follow up with PCP   5.  If worsening symptoms seek treatment          Coronavirus Disease 2019 (COVID-19)     [facility] is committed to the safety and well-being of our they will consider include stopping quarantine  • After 14 days from date of last exposure  • After 10 days without testing from date of last exposure  • After day 7 from date of last exposure with a negative test result (test must occur on day 5 or later) often, like counters, tabletops, and doorknobs. Use household cleaning sprays or wipes according to the label instructions.          Seek Further Care     If you are awaiting test results or are confirmed positive for COVID -19, and your symptoms worsen at have not received a call within 2 business days, please call your primary care provider or check [facility] for results. Post-Discharge Follow-up  If you are diagnosed with COVID, refrain from exercise until approved by your primary care provider.  Please c coronavirus disease 2019, [employer].pt. pdf  Centers for Disease Control & Prevention (CDC)  10 things you can do to manage your health at home, [place].comco.uk away from other people    References:  Long haulers: Why some people experience long-term coronavirus symptoms. (2021, February 08). Retrieved March 17, 2021, from https://health.[facility]/coronavirus/covid-19-information/covid-19-long-juan. html  Long brain damage. · Your appetite may be poor, so a light diet is fine. Stay well hydrated by drinking 6 to 8 glasses of fluids per day (water, soft drinks, juices, tea, or soup). Extra fluids will help loosen secretions in the nose and lungs.   · Over-the-cou

## 2022-05-24 ENCOUNTER — HOSPITAL ENCOUNTER (OUTPATIENT)
Age: 22
Discharge: HOME | End: 2022-05-24
Attending: EMERGENCY MEDICINE
Payer: COMMERCIAL

## 2022-05-24 VITALS
SYSTOLIC BLOOD PRESSURE: 138 MMHG | HEART RATE: 100 BPM | WEIGHT: 235 LBS | BODY MASS INDEX: 28.62 KG/M2 | TEMPERATURE: 98 F | OXYGEN SATURATION: 98 % | DIASTOLIC BLOOD PRESSURE: 88 MMHG | RESPIRATION RATE: 18 BRPM | HEIGHT: 76 IN

## 2022-05-24 DIAGNOSIS — H65.92 LEFT SEROUS OTITIS MEDIA, UNSPECIFIED CHRONICITY: Primary | ICD-10-CM

## 2022-05-24 LAB
S PYO AG THROAT QL: NEGATIVE
SARS-COV-2 RNA RESP QL NAA+PROBE: NOT DETECTED

## 2022-05-24 PROCEDURE — 99213 OFFICE O/P EST LOW 20 MIN: CPT

## 2022-05-24 PROCEDURE — 99212 OFFICE O/P EST SF 10 MIN: CPT

## 2022-05-24 PROCEDURE — 87880 STREP A ASSAY W/OPTIC: CPT

## 2022-05-24 NOTE — ED INITIAL ASSESSMENT (HPI)
Recently treated for ear infection Patient reports being on steroids and ABX. C/o residual left ear pain \"feels like fluidis still in ear\" left sided  Throat pain left sided hed ache.

## 2022-11-15 ENCOUNTER — TELEPHONE (OUTPATIENT)
Dept: INTERNAL MEDICINE CLINIC | Facility: CLINIC | Age: 22
End: 2022-11-15

## 2022-11-15 DIAGNOSIS — Z13.0 SCREENING FOR BLOOD DISEASE: ICD-10-CM

## 2022-11-15 DIAGNOSIS — Z13.29 SCREENING FOR THYROID DISORDER: ICD-10-CM

## 2022-11-15 DIAGNOSIS — Z00.00 ROUTINE GENERAL MEDICAL EXAMINATION AT A HEALTH CARE FACILITY: Primary | ICD-10-CM

## 2022-11-15 DIAGNOSIS — Z13.220 SCREENING FOR LIPID DISORDERS: ICD-10-CM

## 2022-11-15 DIAGNOSIS — Z13.228 SCREENING FOR METABOLIC DISORDER: ICD-10-CM

## 2022-11-15 NOTE — TELEPHONE ENCOUNTER
Annual Physical   Future Appointments   Date Time Provider Dinesh Alvaradoi   1/3/2023  2:20 PM Woo Barron MD [facility]       Lab is [facility]  Pt aware to fast and to complete labs no sooner than 2 weeks prior to physical   No call back required

## 2023-01-03 ENCOUNTER — OFFICE VISIT (OUTPATIENT)
Dept: INTERNAL MEDICINE CLINIC | Facility: CLINIC | Age: 23
End: 2023-01-03
Payer: COMMERCIAL

## 2023-01-03 VITALS
BODY MASS INDEX: 29.22 KG/M2 | WEIGHT: 240 LBS | DIASTOLIC BLOOD PRESSURE: 80 MMHG | TEMPERATURE: 97 F | RESPIRATION RATE: 16 BRPM | SYSTOLIC BLOOD PRESSURE: 124 MMHG | HEART RATE: 68 BPM | OXYGEN SATURATION: 98 % | HEIGHT: 76 IN

## 2023-01-03 DIAGNOSIS — F41.1 GENERALIZED ANXIETY DISORDER: ICD-10-CM

## 2023-01-03 DIAGNOSIS — F32.A DEPRESSION, UNSPECIFIED DEPRESSION TYPE: ICD-10-CM

## 2023-01-03 DIAGNOSIS — F41.0 PANIC DISORDER WITHOUT AGORAPHOBIA: ICD-10-CM

## 2023-01-03 DIAGNOSIS — R22.1 MASS OF RIGHT SIDE OF NECK: ICD-10-CM

## 2023-01-03 DIAGNOSIS — F90.2 ATTENTION DEFICIT HYPERACTIVITY DISORDER (ADHD), COMBINED TYPE: ICD-10-CM

## 2023-01-03 DIAGNOSIS — Z00.00 ROUTINE GENERAL MEDICAL EXAMINATION AT A HEALTH CARE FACILITY: Primary | ICD-10-CM

## 2023-01-03 DIAGNOSIS — N63.10 MASS OF RIGHT BREAST, UNSPECIFIED QUADRANT: ICD-10-CM

## 2023-01-03 PROCEDURE — 99395 PREV VISIT EST AGE 18-39: CPT | Performed by: INTERNAL MEDICINE

## 2023-01-03 PROCEDURE — 3079F DIAST BP 80-89 MM HG: CPT | Performed by: INTERNAL MEDICINE

## 2023-01-03 PROCEDURE — 3074F SYST BP LT 130 MM HG: CPT | Performed by: INTERNAL MEDICINE

## 2023-01-03 PROCEDURE — 90471 IMMUNIZATION ADMIN: CPT | Performed by: INTERNAL MEDICINE

## 2023-01-03 PROCEDURE — 90686 IIV4 VACC NO PRSV 0.5 ML IM: CPT | Performed by: INTERNAL MEDICINE

## 2023-01-03 PROCEDURE — 3008F BODY MASS INDEX DOCD: CPT | Performed by: INTERNAL MEDICINE

## 2023-01-04 ENCOUNTER — HOSPITAL ENCOUNTER (OUTPATIENT)
Dept: ULTRASOUND IMAGING | Age: 23
Discharge: HOME | End: 2023-01-04
Attending: INTERNAL MEDICINE
Payer: COMMERCIAL

## 2023-01-04 DIAGNOSIS — R22.1 MASS OF RIGHT SIDE OF NECK: ICD-10-CM

## 2023-01-04 PROCEDURE — 76536 US EXAM OF HEAD AND NECK: CPT | Performed by: INTERNAL MEDICINE

## 2023-01-06 ENCOUNTER — HOSPITAL ENCOUNTER (OUTPATIENT)
Dept: MAMMOGRAPHY | Facility: HOSPITAL | Age: 23
Discharge: HOME | End: 2023-01-06
Attending: INTERNAL MEDICINE
Payer: COMMERCIAL

## 2023-01-06 DIAGNOSIS — N63.10 MASS OF RIGHT BREAST, UNSPECIFIED QUADRANT: ICD-10-CM

## 2023-01-06 PROCEDURE — 76642 ULTRASOUND BREAST LIMITED: CPT | Performed by: INTERNAL MEDICINE

## 2023-12-26 ENCOUNTER — LAB ENCOUNTER (OUTPATIENT)
Dept: LAB | Age: 23
End: 2023-12-26
Attending: PHYSICIAN ASSISTANT
Payer: COMMERCIAL

## 2023-12-26 ENCOUNTER — OFFICE VISIT (OUTPATIENT)
Dept: INTERNAL MEDICINE CLINIC | Facility: CLINIC | Age: 23
End: 2023-12-26
Payer: COMMERCIAL

## 2023-12-26 VITALS
HEIGHT: 76 IN | RESPIRATION RATE: 18 BRPM | TEMPERATURE: 98 F | WEIGHT: 252.81 LBS | BODY MASS INDEX: 30.78 KG/M2 | SYSTOLIC BLOOD PRESSURE: 138 MMHG | DIASTOLIC BLOOD PRESSURE: 82 MMHG

## 2023-12-26 DIAGNOSIS — R37 SEXUAL DYSFUNCTION: ICD-10-CM

## 2023-12-26 DIAGNOSIS — Z11.3 SCREEN FOR STD (SEXUALLY TRANSMITTED DISEASE): ICD-10-CM

## 2023-12-26 DIAGNOSIS — Z11.3 SCREEN FOR STD (SEXUALLY TRANSMITTED DISEASE): Primary | ICD-10-CM

## 2023-12-26 LAB
ALBUMIN SERPL-MCNC: 4.2 G/DL (ref 3.4–5)
ALBUMIN/GLOB SERPL: 1.2 {RATIO} (ref 1–2)
ALP LIVER SERPL-CCNC: 103 U/L
ALT SERPL-CCNC: 48 U/L
ANION GAP SERPL CALC-SCNC: 7 MMOL/L (ref 0–18)
AST SERPL-CCNC: 19 U/L (ref 15–37)
BASOPHILS # BLD AUTO: 0.09 X10(3) UL (ref 0–0.2)
BASOPHILS NFR BLD AUTO: 1.1 %
BILIRUB SERPL-MCNC: 0.4 MG/DL (ref 0.1–2)
BUN BLD-MCNC: 13 MG/DL (ref 9–23)
CALCIUM BLD-MCNC: 9.6 MG/DL (ref 8.5–10.1)
CHLORIDE SERPL-SCNC: 106 MMOL/L (ref 98–112)
CO2 SERPL-SCNC: 26 MMOL/L (ref 21–32)
CREAT BLD-MCNC: 1.12 MG/DL
EGFRCR SERPLBLD CKD-EPI 2021: 94 ML/MIN/1.73M2 (ref 60–?)
EOSINOPHIL # BLD AUTO: 0.62 X10(3) UL (ref 0–0.7)
EOSINOPHIL NFR BLD AUTO: 7.4 %
ERYTHROCYTE [DISTWIDTH] IN BLOOD BY AUTOMATED COUNT: 12.2 %
EST. AVERAGE GLUCOSE BLD GHB EST-MCNC: 108 MG/DL (ref 68–126)
FASTING STATUS PATIENT QL REPORTED: YES
GLOBULIN PLAS-MCNC: 3.6 G/DL (ref 2.8–4.4)
GLUCOSE BLD-MCNC: 110 MG/DL (ref 70–99)
HBA1C MFR BLD: 5.4 % (ref ?–5.7)
HBV SURFACE AB SER QL: NONREACTIVE
HBV SURFACE AB SERPL IA-ACNC: 3.97 MIU/ML
HBV SURFACE AG SER-ACNC: <0.1 [IU]/L
HBV SURFACE AG SERPL QL IA: NONREACTIVE
HCT VFR BLD AUTO: 48.1 %
HCV AB SERPL QL IA: NONREACTIVE
HGB BLD-MCNC: 16.5 G/DL
IMM GRANULOCYTES # BLD AUTO: 0.07 X10(3) UL (ref 0–1)
IMM GRANULOCYTES NFR BLD: 0.8 %
LYMPHOCYTES # BLD AUTO: 2.05 X10(3) UL (ref 1–4)
LYMPHOCYTES NFR BLD AUTO: 24.6 %
MCH RBC QN AUTO: 30.2 PG (ref 26–34)
MCHC RBC AUTO-ENTMCNC: 34.3 G/DL (ref 31–37)
MCV RBC AUTO: 87.9 FL
MONOCYTES # BLD AUTO: 0.44 X10(3) UL (ref 0.1–1)
MONOCYTES NFR BLD AUTO: 5.3 %
NEUTROPHILS # BLD AUTO: 5.07 X10 (3) UL (ref 1.5–7.7)
NEUTROPHILS # BLD AUTO: 5.07 X10(3) UL (ref 1.5–7.7)
NEUTROPHILS NFR BLD AUTO: 60.8 %
OSMOLALITY SERPL CALC.SUM OF ELEC: 289 MOSM/KG (ref 275–295)
PLATELET # BLD AUTO: 227 10(3)UL (ref 150–450)
POTASSIUM SERPL-SCNC: 4 MMOL/L (ref 3.5–5.1)
PROT SERPL-MCNC: 7.8 G/DL (ref 6.4–8.2)
RBC # BLD AUTO: 5.47 X10(6)UL
SODIUM SERPL-SCNC: 139 MMOL/L (ref 136–145)
T PALLIDUM AB SER QL IA: NONREACTIVE
TSI SER-ACNC: 1.95 MIU/ML (ref 0.36–3.74)
VIT D+METAB SERPL-MCNC: 13.4 NG/ML (ref 30–100)
WBC # BLD AUTO: 8.3 X10(3) UL (ref 4–11)

## 2023-12-26 PROCEDURE — 3079F DIAST BP 80-89 MM HG: CPT | Performed by: PHYSICIAN ASSISTANT

## 2023-12-26 PROCEDURE — 3008F BODY MASS INDEX DOCD: CPT | Performed by: PHYSICIAN ASSISTANT

## 2023-12-26 PROCEDURE — 82306 VITAMIN D 25 HYDROXY: CPT

## 2023-12-26 PROCEDURE — 83036 HEMOGLOBIN GLYCOSYLATED A1C: CPT

## 2023-12-26 PROCEDURE — 84443 ASSAY THYROID STIM HORMONE: CPT

## 2023-12-26 PROCEDURE — 86706 HEP B SURFACE ANTIBODY: CPT

## 2023-12-26 PROCEDURE — 84402 ASSAY OF FREE TESTOSTERONE: CPT

## 2023-12-26 PROCEDURE — 80053 COMPREHEN METABOLIC PANEL: CPT | Performed by: PHYSICIAN ASSISTANT

## 2023-12-26 PROCEDURE — 84403 ASSAY OF TOTAL TESTOSTERONE: CPT

## 2023-12-26 PROCEDURE — 36415 COLL VENOUS BLD VENIPUNCTURE: CPT | Performed by: PHYSICIAN ASSISTANT

## 2023-12-26 PROCEDURE — 87389 HIV-1 AG W/HIV-1&-2 AB AG IA: CPT

## 2023-12-26 PROCEDURE — 87340 HEPATITIS B SURFACE AG IA: CPT

## 2023-12-26 PROCEDURE — 90686 IIV4 VACC NO PRSV 0.5 ML IM: CPT | Performed by: PHYSICIAN ASSISTANT

## 2023-12-26 PROCEDURE — 87491 CHLMYD TRACH DNA AMP PROBE: CPT

## 2023-12-26 PROCEDURE — 99213 OFFICE O/P EST LOW 20 MIN: CPT | Performed by: PHYSICIAN ASSISTANT

## 2023-12-26 PROCEDURE — 87591 N.GONORRHOEAE DNA AMP PROB: CPT

## 2023-12-26 PROCEDURE — 85025 COMPLETE CBC W/AUTO DIFF WBC: CPT | Performed by: PHYSICIAN ASSISTANT

## 2023-12-26 PROCEDURE — 90472 IMMUNIZATION ADMIN EACH ADD: CPT | Performed by: PHYSICIAN ASSISTANT

## 2023-12-26 PROCEDURE — 90715 TDAP VACCINE 7 YRS/> IM: CPT | Performed by: PHYSICIAN ASSISTANT

## 2023-12-26 PROCEDURE — 86780 TREPONEMA PALLIDUM: CPT

## 2023-12-26 PROCEDURE — 90471 IMMUNIZATION ADMIN: CPT | Performed by: PHYSICIAN ASSISTANT

## 2023-12-26 PROCEDURE — 86803 HEPATITIS C AB TEST: CPT

## 2023-12-26 PROCEDURE — 3075F SYST BP GE 130 - 139MM HG: CPT | Performed by: PHYSICIAN ASSISTANT

## 2023-12-27 DIAGNOSIS — E55.9 VITAMIN D DEFICIENCY: Primary | ICD-10-CM

## 2023-12-27 LAB
C TRACH DNA SPEC QL NAA+PROBE: NEGATIVE
N GONORRHOEA DNA SPEC QL NAA+PROBE: NEGATIVE

## 2023-12-27 RX ORDER — ERGOCALCIFEROL 1.25 MG/1
50000 CAPSULE ORAL WEEKLY
Qty: 12 CAPSULE | Refills: 0 | Status: SHIPPED | OUTPATIENT
Start: 2023-12-27 | End: 2024-03-20

## 2024-01-04 LAB
FREE TESTOST DIRECT: 24.2 PG/ML
TESTOSTERONE: 263 NG/DL

## 2024-02-24 ENCOUNTER — APPOINTMENT (OUTPATIENT)
Dept: GENERAL RADIOLOGY | Age: 24
End: 2024-02-24
Attending: EMERGENCY MEDICINE
Payer: COMMERCIAL

## 2024-02-24 ENCOUNTER — HOSPITAL ENCOUNTER (OUTPATIENT)
Age: 24
Discharge: HOME | End: 2024-02-24
Payer: COMMERCIAL

## 2024-02-24 VITALS
SYSTOLIC BLOOD PRESSURE: 129 MMHG | TEMPERATURE: 97 F | DIASTOLIC BLOOD PRESSURE: 89 MMHG | WEIGHT: 245 LBS | BODY MASS INDEX: 29.83 KG/M2 | HEART RATE: 79 BPM | RESPIRATION RATE: 16 BRPM | HEIGHT: 76 IN | OXYGEN SATURATION: 97 %

## 2024-02-24 DIAGNOSIS — H66.93 BILATERAL OTITIS MEDIA, UNSPECIFIED OTITIS MEDIA TYPE: ICD-10-CM

## 2024-02-24 DIAGNOSIS — M25.531 RIGHT WRIST PAIN: ICD-10-CM

## 2024-02-24 DIAGNOSIS — H61.23 BILATERAL IMPACTED CERUMEN: Primary | ICD-10-CM

## 2024-02-24 PROCEDURE — 99214 OFFICE O/P EST MOD 30 MIN: CPT

## 2024-02-24 PROCEDURE — 73110 X-RAY EXAM OF WRIST: CPT | Performed by: EMERGENCY MEDICINE

## 2024-02-24 PROCEDURE — 99213 OFFICE O/P EST LOW 20 MIN: CPT

## 2024-02-24 PROCEDURE — 69209 REMOVE IMPACTED EAR WAX UNI: CPT

## 2024-02-24 RX ORDER — AMOXICILLIN AND CLAVULANATE POTASSIUM 875; 125 MG/1; MG/1
1 TABLET, FILM COATED ORAL 2 TIMES DAILY
Qty: 20 TABLET | Refills: 0 | Status: SHIPPED | OUTPATIENT
Start: 2024-02-24 | End: 2024-03-05

## 2024-02-24 NOTE — DISCHARGE INSTRUCTIONS
Take antibiotic as needed.  If symptoms persist follow-up with ENT.  Follow-up with Ortho in your wrist.  You may take Tylenol and Motrin as needed for wrist pain.

## 2024-02-24 NOTE — ED INITIAL ASSESSMENT (HPI)
Difficulty hearing out of left ear x1.5 weeks. +pressure. Also c/o chest congestion.     Also c/o right wrist pain x1 week, after working out.

## 2024-02-24 NOTE — ED PROVIDER NOTES
Patient Seen in: [facility]      History     Chief Complaint   Patient presents with    Arm or Hand Injury     I also cannot hear out of my ear - Entered by patient    Ear Problem Pain    Cough/URI     Stated Complaint: Arm or Hand Injury - I also cannot hear out of my ear    Subjective:   HPI  23-year-old male presents with 2 complaints.  Patient states he recently had cold symptoms and has had difficulty hearing out of his left ear for the past couple of weeks.  He denies any pain.  He has previously had ear infections and issues with his ears in childhood.  He denies any fever or chills.  Patient also states that he has had right dorsal wrist pain after lifting weights.  He states the pain is worse with flexion and extension.  He has previously injured this wrist.  He denies any numbness or weakness.  He denies any redness or warmth.    Objective:   Past Medical History:   Diagnosis Date    Anxiety     Depression               History reviewed. No pertinent surgical history.             Social History     Socioeconomic History    Marital status: Single   Tobacco Use    Smoking status: Never    Smokeless tobacco: Never   Vaping Use    Vaping Use: Never used   Substance and Sexual Activity    Alcohol use: No    Drug use: Yes     Types: Cannabis    Sexual activity: Not Currently              Review of Systems   All other systems reviewed and are negative.      Positive for stated complaint: Arm or Hand Injury - I also cannot hear out of my ear  Other systems are as noted in HPI.  Constitutional and vital signs reviewed.      All other systems reviewed and negative except as noted above.    Physical Exam     ED Triage Vitals   BP 02/24/24 1356 (!) 132/100   Pulse 02/24/24 1356 82   Resp 02/24/24 1355 20   Temp 02/24/24 1355 97.3 °F (36.3 °C)   Temp src 02/24/24 1355 Temporal   SpO2 02/24/24 1356 96 %   O2 Device 02/24/24 1356 None (Room air)       Current:/89   Pulse 79   Temp 97.3 °F (36.3  °C) (Temporal)   Resp 16   Ht 193 cm (6' 4\")   Wt 111.1 kg   SpO2 97%   BMI 29.82 kg/m²         Physical Exam  Vitals and nursing note reviewed.   Constitutional:       General: He is not in acute distress.     Appearance: He is well-developed. He is not ill-appearing or toxic-appearing.   HENT:      Ears:      Comments: Bilateral cerumen impaction     Nose: Congestion present. No rhinorrhea.      Mouth/Throat:      Pharynx: No oropharyngeal exudate or posterior oropharyngeal erythema.   Cardiovascular:      Rate and Rhythm: Normal rate and regular rhythm.      Heart sounds: Normal heart sounds.   Pulmonary:      Effort: Pulmonary effort is normal. No respiratory distress.      Breath sounds: Normal breath sounds. No wheezing.   Musculoskeletal:      Comments: Full range of motion of right wrist with no significant swelling, redness, or tenderness.  Patient reports tenderness to the dorsal aspect with flexion and extension.  Strength intact.  Pulses intact.   Skin:     General: Skin is warm and dry.   Neurological:      Mental Status: He is alert and oriented to person, place, and time.             ED Course   Labs Reviewed - No data to display          XR WRIST COMPLETE (MIN 3 VIEWS), RIGHT (CPT=73110)    Result Date: 2/24/2024  PROCEDURE:  XR WRIST COMPLETE (MIN 3 VIEWS), RIGHT (CPT=73110)  TECHNIQUE:  Three views were obtained.  COMPARISON:  None.  INDICATIONS:  Arm or Hand Injury - I also cannot hear out of my ear  PATIENT STATED HISTORY: (As transcribed by Technologist)  Patient states chronic right wrsit pain. Pain is worse while lifting weight, and or bending right wirst. Hx right wrist fracture x 9-10 years ago. Shielded               CONCLUSION:  No acute fracture or dislocation.   LOCATION:  [facility]   Dictated by (CST): Jayshree Gibbons MD on 2/24/2024 at 2:24 PM     Finalized by (CST): Jayshree Gibbons MD on 2/24/2024 at 2:24 PM        Annual irrigation performed of bilateral ears for cerumen removal and  then curette was used bilaterally with good results.  Upon reinspection patient with bilateral TM erythema and swelling.       MDM     Medical Decision Making  23-year-old male presents with 2 complaints.  Patient states he recently had cold symptoms and has had difficulty hearing out of his left ear for the past couple of weeks.  He denies any pain.  He has previously had ear infections and issues with his ears in childhood.  He denies any fever or chills.  Patient also states that he has had right dorsal wrist pain after lifting weights.  He states the pain is worse with flexion and extension.  He has previously injured this wrist.  He denies any numbness or weakness.  He denies any redness or warmth.    Clinical impression: Bilateral impacted cerumen, bilateral otitis media, right wrist pain    Differential diagnosis: Cerumen impaction, otitis media, otitis externa, otalgia, wrist pain, wrist fracture, wrist dislocation, septic joint    Bilateral impacted cerumen which is removed and then there is evidence of bilateral otitis media which will be treated with antibiotics.  Wrist x-ray with no fracture or dislocation.  There is tenderness over the dorsal aspect which is possible tendinitis.  There is no evidence of septic joint.  Pulses are intact.  Patient instructed on symptomatic treatment Tylenol/Motrin and follow-up with orthopedic if pain persists.  Vital signs are stable.    Problems Addressed:  Bilateral impacted cerumen: acute illness or injury  Bilateral otitis media, unspecified otitis media type: acute illness or injury  Right wrist pain: acute illness or injury    Amount and/or Complexity of Data Reviewed  Radiology: ordered and independent interpretation performed.     Details: Wrist with no fracture or dislocation        Disposition and Plan     Clinical Impression:  1. Bilateral impacted cerumen    2. Bilateral otitis media, unspecified otitis media type    3. Right wrist pain          Disposition:  Discharge  2/24/2024  3:15 pm    Follow-up:  Daniel Golden MD  [address] [facility] [address] [phone]    Call   As needed    Courtney Penaloza PA  [address] [place] [address] [phone]    Call             Medications Prescribed:  Discharge Medication List as of 2/24/2024  3:17 PM        START taking these medications    Details   amoxicillin clavulanate 875-125 MG Oral Tab Take 1 tablet by mouth 2 (two) times daily for 10 days., Normal, Disp-20 tablet, R-0

## 2024-03-25 DIAGNOSIS — E55.9 VITAMIN D DEFICIENCY: ICD-10-CM

## 2024-03-27 RX ORDER — ERGOCALCIFEROL 1.25 MG/1
50000 CAPSULE ORAL WEEKLY
Qty: 12 CAPSULE | Refills: 0 | OUTPATIENT
Start: 2024-03-27

## 2024-03-27 NOTE — TELEPHONE ENCOUNTER
Requested Prescriptions     Pending Prescriptions Disp Refills    ERGOCALCIFEROL 1.25 MG (10576 UT) Oral Cap [Pharmacy Med Name: VITAMIN D2 50,000IU (ERGO) CAP RX] 12 capsule 0     Sig: TAKE 1 CAPSULE BY MOUTH 1 TIME A WEEK       LOV: 12--cs-acute visit    LAST CPE: 1-3-2023-ad-physical    Last Labs: 12--vitamin d    Last Refill:   Vit D is low.  Would suggest pt take Vit D 96306 int units weekly for 12 weeks and then change to 2000 int units daily (OTC) after the 12 weeks.  Repeat Vit D level in 12 weeks as well.  STI screening is negative thus far.  Normal A1c and TSH.

## (undated) NOTE — LETTER
Date & Time: 1/6/2020, 7:26 PM  Patient: Cecile Acosta  Encounter Provider(s):    MD Yaima Albert MD       To Whom It May Concern:    Pipo Tejeda was seen and treated in our department on 1/6/2020.  He should not r

## (undated) NOTE — ED AVS SNAPSHOT
Mira Huston   MRN: [id number]    Department:  [facility] Emergency Department   Date of Visit:  1/6/2020           Disclosure     Insurance plans vary and the physician(s) referred by the ER may not be covered by your plan.  Please contact tell this physician (or your personal doctor if your instructions are to return to your personal doctor) about any new or lasting problems. The primary care or specialist physician will see patients referred from the [facility] Emergency Department.  Rachele Rose